# Patient Record
Sex: FEMALE | Race: WHITE | NOT HISPANIC OR LATINO | Employment: FULL TIME | URBAN - METROPOLITAN AREA
[De-identification: names, ages, dates, MRNs, and addresses within clinical notes are randomized per-mention and may not be internally consistent; named-entity substitution may affect disease eponyms.]

---

## 2024-08-30 ENCOUNTER — APPOINTMENT (OUTPATIENT)
Dept: RADIOLOGY | Facility: HOSPITAL | Age: 44
End: 2024-08-30
Payer: COMMERCIAL

## 2024-08-30 ENCOUNTER — APPOINTMENT (OUTPATIENT)
Dept: CARDIOLOGY | Facility: HOSPITAL | Age: 44
End: 2024-08-30
Payer: COMMERCIAL

## 2024-08-30 ENCOUNTER — HOSPITAL ENCOUNTER (OUTPATIENT)
Facility: HOSPITAL | Age: 44
Setting detail: OBSERVATION
Discharge: LEFT BEFORE DISCHARGE | End: 2024-08-31
Attending: STUDENT IN AN ORGANIZED HEALTH CARE EDUCATION/TRAINING PROGRAM | Admitting: INTERNAL MEDICINE
Payer: COMMERCIAL

## 2024-08-30 DIAGNOSIS — R11.2 NAUSEA VOMITING AND DIARRHEA: ICD-10-CM

## 2024-08-30 DIAGNOSIS — R53.1 GENERALIZED WEAKNESS: Primary | ICD-10-CM

## 2024-08-30 DIAGNOSIS — R19.7 NAUSEA VOMITING AND DIARRHEA: ICD-10-CM

## 2024-08-30 DIAGNOSIS — N30.00 ACUTE CYSTITIS WITHOUT HEMATURIA: ICD-10-CM

## 2024-08-30 DIAGNOSIS — R73.9 HYPERGLYCEMIA: ICD-10-CM

## 2024-08-30 DIAGNOSIS — B37.31 VAGINAL CANDIDA: ICD-10-CM

## 2024-08-30 PROBLEM — R51.9 ACUTE NONINTRACTABLE HEADACHE, UNSPECIFIED HEADACHE TYPE: Status: ACTIVE | Noted: 2024-08-30

## 2024-08-30 LAB
ALBUMIN SERPL BCP-MCNC: 3.4 G/DL (ref 3.4–5)
ALP SERPL-CCNC: 104 U/L (ref 33–110)
ALT SERPL W P-5'-P-CCNC: 39 U/L (ref 7–45)
ANION GAP SERPL CALC-SCNC: 13 MMOL/L (ref 10–20)
APPEARANCE UR: ABNORMAL
AST SERPL W P-5'-P-CCNC: 41 U/L (ref 9–39)
B-HCG SERPL-ACNC: <2 MIU/ML
B-OH-BUTYR SERPL-SCNC: 0.31 MMOL/L (ref 0.02–0.27)
BACTERIA #/AREA URNS AUTO: ABNORMAL /HPF
BASE EXCESS BLDV CALC-SCNC: 10.5 MMOL/L (ref -2–3)
BASOPHILS # BLD AUTO: 0.01 X10*3/UL (ref 0–0.1)
BASOPHILS NFR BLD AUTO: 0.1 %
BILIRUB SERPL-MCNC: 0.9 MG/DL (ref 0–1.2)
BILIRUB UR STRIP.AUTO-MCNC: NEGATIVE MG/DL
BODY TEMPERATURE: 37 DEGREES CELSIUS
BUN SERPL-MCNC: 4 MG/DL (ref 6–23)
CALCIUM SERPL-MCNC: 9.2 MG/DL (ref 8.6–10.3)
CHLORIDE SERPL-SCNC: 85 MMOL/L (ref 98–107)
CO2 SERPL-SCNC: 32 MMOL/L (ref 21–32)
COLOR UR: YELLOW
CREAT SERPL-MCNC: 0.65 MG/DL (ref 0.5–1.05)
EGFRCR SERPLBLD CKD-EPI 2021: >90 ML/MIN/1.73M*2
EOSINOPHIL # BLD AUTO: 0.03 X10*3/UL (ref 0–0.7)
EOSINOPHIL NFR BLD AUTO: 0.4 %
ERYTHROCYTE [DISTWIDTH] IN BLOOD BY AUTOMATED COUNT: 13.3 % (ref 11.5–14.5)
GLUCOSE BLD MANUAL STRIP-MCNC: 299 MG/DL (ref 74–99)
GLUCOSE BLD MANUAL STRIP-MCNC: 336 MG/DL (ref 74–99)
GLUCOSE BLD MANUAL STRIP-MCNC: 340 MG/DL (ref 74–99)
GLUCOSE BLD MANUAL STRIP-MCNC: 449 MG/DL (ref 74–99)
GLUCOSE SERPL-MCNC: 435 MG/DL (ref 74–99)
GLUCOSE UR STRIP.AUTO-MCNC: ABNORMAL MG/DL
HCO3 BLDV-SCNC: 35.1 MMOL/L (ref 22–26)
HCT VFR BLD AUTO: 36.3 % (ref 36–46)
HGB BLD-MCNC: 12.2 G/DL (ref 12–16)
IMM GRANULOCYTES # BLD AUTO: 0.02 X10*3/UL (ref 0–0.7)
IMM GRANULOCYTES NFR BLD AUTO: 0.3 % (ref 0–0.9)
INHALED O2 CONCENTRATION: 21 %
KETONES UR STRIP.AUTO-MCNC: ABNORMAL MG/DL
LACTATE SERPL-SCNC: 1.3 MMOL/L (ref 0.4–2)
LEUKOCYTE ESTERASE UR QL STRIP.AUTO: ABNORMAL
LIPASE SERPL-CCNC: <3 U/L (ref 9–82)
LYMPHOCYTES # BLD AUTO: 1.13 X10*3/UL (ref 1.2–4.8)
LYMPHOCYTES NFR BLD AUTO: 16.8 %
MAGNESIUM SERPL-MCNC: 1.82 MG/DL (ref 1.6–2.4)
MCH RBC QN AUTO: 26.1 PG (ref 26–34)
MCHC RBC AUTO-ENTMCNC: 33.6 G/DL (ref 32–36)
MCV RBC AUTO: 78 FL (ref 80–100)
MONOCYTES # BLD AUTO: 0.73 X10*3/UL (ref 0.1–1)
MONOCYTES NFR BLD AUTO: 10.9 %
NEUTROPHILS # BLD AUTO: 4.8 X10*3/UL (ref 1.2–7.7)
NEUTROPHILS NFR BLD AUTO: 71.5 %
NITRITE UR QL STRIP.AUTO: NEGATIVE
NRBC BLD-RTO: 0 /100 WBCS (ref 0–0)
OXYHGB MFR BLDV: 77.1 % (ref 45–75)
PCO2 BLDV: 45 MM HG (ref 41–51)
PH BLDV: 7.5 PH (ref 7.33–7.43)
PH UR STRIP.AUTO: 6.5 [PH]
PLATELET # BLD AUTO: 310 X10*3/UL (ref 150–450)
PO2 BLDV: 48 MM HG (ref 35–45)
POTASSIUM SERPL-SCNC: 3.3 MMOL/L (ref 3.5–5.3)
PROT SERPL-MCNC: 7.6 G/DL (ref 6.4–8.2)
PROT UR STRIP.AUTO-MCNC: ABNORMAL MG/DL
RBC # BLD AUTO: 4.67 X10*6/UL (ref 4–5.2)
RBC # UR STRIP.AUTO: ABNORMAL /UL
RBC #/AREA URNS AUTO: ABNORMAL /HPF
SAO2 % BLDV: 80 % (ref 45–75)
SARS-COV-2 RNA RESP QL NAA+PROBE: NOT DETECTED
SODIUM SERPL-SCNC: 127 MMOL/L (ref 136–145)
SP GR UR STRIP.AUTO: 1.01
SQUAMOUS #/AREA URNS AUTO: ABNORMAL /HPF
UROBILINOGEN UR STRIP.AUTO-MCNC: ABNORMAL MG/DL
WBC # BLD AUTO: 6.7 X10*3/UL (ref 4.4–11.3)
WBC #/AREA URNS AUTO: >50 /HPF

## 2024-08-30 PROCEDURE — 84075 ASSAY ALKALINE PHOSPHATASE: CPT | Performed by: PHYSICIAN ASSISTANT

## 2024-08-30 PROCEDURE — 84702 CHORIONIC GONADOTROPIN TEST: CPT | Performed by: PHYSICIAN ASSISTANT

## 2024-08-30 PROCEDURE — 83036 HEMOGLOBIN GLYCOSYLATED A1C: CPT | Performed by: STUDENT IN AN ORGANIZED HEALTH CARE EDUCATION/TRAINING PROGRAM

## 2024-08-30 PROCEDURE — 2500000002 HC RX 250 W HCPCS SELF ADMINISTERED DRUGS (ALT 637 FOR MEDICARE OP, ALT 636 FOR OP/ED): Performed by: STUDENT IN AN ORGANIZED HEALTH CARE EDUCATION/TRAINING PROGRAM

## 2024-08-30 PROCEDURE — 96365 THER/PROPH/DIAG IV INF INIT: CPT

## 2024-08-30 PROCEDURE — 81001 URINALYSIS AUTO W/SCOPE: CPT | Performed by: PHYSICIAN ASSISTANT

## 2024-08-30 PROCEDURE — 82947 ASSAY GLUCOSE BLOOD QUANT: CPT

## 2024-08-30 PROCEDURE — 36415 COLL VENOUS BLD VENIPUNCTURE: CPT | Performed by: PHYSICIAN ASSISTANT

## 2024-08-30 PROCEDURE — G0378 HOSPITAL OBSERVATION PER HR: HCPCS

## 2024-08-30 PROCEDURE — 96375 TX/PRO/DX INJ NEW DRUG ADDON: CPT

## 2024-08-30 PROCEDURE — 71045 X-RAY EXAM CHEST 1 VIEW: CPT | Performed by: RADIOLOGY

## 2024-08-30 PROCEDURE — 93005 ELECTROCARDIOGRAM TRACING: CPT

## 2024-08-30 PROCEDURE — 83605 ASSAY OF LACTIC ACID: CPT | Performed by: PHYSICIAN ASSISTANT

## 2024-08-30 PROCEDURE — 2500000001 HC RX 250 WO HCPCS SELF ADMINISTERED DRUGS (ALT 637 FOR MEDICARE OP): Performed by: STUDENT IN AN ORGANIZED HEALTH CARE EDUCATION/TRAINING PROGRAM

## 2024-08-30 PROCEDURE — 71045 X-RAY EXAM CHEST 1 VIEW: CPT

## 2024-08-30 PROCEDURE — 83735 ASSAY OF MAGNESIUM: CPT | Performed by: PHYSICIAN ASSISTANT

## 2024-08-30 PROCEDURE — 99285 EMERGENCY DEPT VISIT HI MDM: CPT | Mod: 25

## 2024-08-30 PROCEDURE — 96361 HYDRATE IV INFUSION ADD-ON: CPT

## 2024-08-30 PROCEDURE — 85025 COMPLETE CBC W/AUTO DIFF WBC: CPT | Performed by: PHYSICIAN ASSISTANT

## 2024-08-30 PROCEDURE — 2500000004 HC RX 250 GENERAL PHARMACY W/ HCPCS (ALT 636 FOR OP/ED): Performed by: STUDENT IN AN ORGANIZED HEALTH CARE EDUCATION/TRAINING PROGRAM

## 2024-08-30 PROCEDURE — 96366 THER/PROPH/DIAG IV INF ADDON: CPT

## 2024-08-30 PROCEDURE — 82010 KETONE BODYS QUAN: CPT | Performed by: PHYSICIAN ASSISTANT

## 2024-08-30 PROCEDURE — 83690 ASSAY OF LIPASE: CPT | Performed by: PHYSICIAN ASSISTANT

## 2024-08-30 PROCEDURE — 82805 BLOOD GASES W/O2 SATURATION: CPT | Performed by: PHYSICIAN ASSISTANT

## 2024-08-30 PROCEDURE — 2500000004 HC RX 250 GENERAL PHARMACY W/ HCPCS (ALT 636 FOR OP/ED): Performed by: PHYSICIAN ASSISTANT

## 2024-08-30 PROCEDURE — 82947 ASSAY GLUCOSE BLOOD QUANT: CPT | Mod: 59

## 2024-08-30 PROCEDURE — 2580000001 HC RX 258 IV SOLUTIONS: Performed by: STUDENT IN AN ORGANIZED HEALTH CARE EDUCATION/TRAINING PROGRAM

## 2024-08-30 PROCEDURE — 87086 URINE CULTURE/COLONY COUNT: CPT | Mod: PARLAB | Performed by: PHYSICIAN ASSISTANT

## 2024-08-30 PROCEDURE — 87635 SARS-COV-2 COVID-19 AMP PRB: CPT | Performed by: PHYSICIAN ASSISTANT

## 2024-08-30 RX ORDER — INSULIN LISPRO 100 [IU]/ML
10 INJECTION, SOLUTION INTRAVENOUS; SUBCUTANEOUS ONCE
Status: COMPLETED | OUTPATIENT
Start: 2024-08-31 | End: 2024-08-31

## 2024-08-30 RX ORDER — MAGNESIUM HYDROXIDE 2400 MG/10ML
10 SUSPENSION ORAL DAILY PRN
Status: DISCONTINUED | OUTPATIENT
Start: 2024-08-30 | End: 2024-08-31 | Stop reason: HOSPADM

## 2024-08-30 RX ORDER — FLUCONAZOLE 100 MG/1
150 TABLET ORAL ONCE
Status: COMPLETED | OUTPATIENT
Start: 2024-08-30 | End: 2024-08-30

## 2024-08-30 RX ORDER — TALC
6 POWDER (GRAM) TOPICAL NIGHTLY PRN
Status: DISCONTINUED | OUTPATIENT
Start: 2024-08-30 | End: 2024-08-31 | Stop reason: HOSPADM

## 2024-08-30 RX ORDER — METHADONE HYDROCHLORIDE 10 MG/5ML
70 SOLUTION ORAL DAILY
COMMUNITY
End: 2024-08-31 | Stop reason: HOSPADM

## 2024-08-30 RX ORDER — ONDANSETRON HYDROCHLORIDE 2 MG/ML
4 INJECTION, SOLUTION INTRAVENOUS EVERY 6 HOURS PRN
Status: DISCONTINUED | OUTPATIENT
Start: 2024-08-30 | End: 2024-08-31 | Stop reason: HOSPADM

## 2024-08-30 RX ORDER — SODIUM CHLORIDE AND POTASSIUM CHLORIDE 300; 900 MG/100ML; MG/100ML
100 INJECTION, SOLUTION INTRAVENOUS CONTINUOUS
Status: ACTIVE | OUTPATIENT
Start: 2024-08-30 | End: 2024-08-31

## 2024-08-30 RX ORDER — ACETAMINOPHEN 325 MG/1
650 TABLET ORAL EVERY 4 HOURS PRN
Status: DISCONTINUED | OUTPATIENT
Start: 2024-08-30 | End: 2024-08-31 | Stop reason: HOSPADM

## 2024-08-30 RX ORDER — METFORMIN HYDROCHLORIDE 1000 MG/1
1000 TABLET ORAL 2 TIMES DAILY
COMMUNITY

## 2024-08-30 RX ORDER — INSULIN LISPRO 100 [IU]/ML
0-10 INJECTION, SOLUTION INTRAVENOUS; SUBCUTANEOUS
Status: DISCONTINUED | OUTPATIENT
Start: 2024-08-30 | End: 2024-08-31 | Stop reason: HOSPADM

## 2024-08-30 RX ORDER — MAGNESIUM SULFATE HEPTAHYDRATE 40 MG/ML
2 INJECTION, SOLUTION INTRAVENOUS ONCE
Status: COMPLETED | OUTPATIENT
Start: 2024-08-30 | End: 2024-08-31

## 2024-08-30 RX ORDER — ACETAMINOPHEN 325 MG/1
650 TABLET ORAL EVERY 4 HOURS PRN
Status: DISCONTINUED | OUTPATIENT
Start: 2024-08-30 | End: 2024-08-30

## 2024-08-30 RX ORDER — KETOROLAC TROMETHAMINE 30 MG/ML
15 INJECTION, SOLUTION INTRAMUSCULAR; INTRAVENOUS ONCE
Status: COMPLETED | OUTPATIENT
Start: 2024-08-30 | End: 2024-08-30

## 2024-08-30 RX ORDER — PANTOPRAZOLE SODIUM 40 MG/10ML
40 INJECTION, POWDER, LYOPHILIZED, FOR SOLUTION INTRAVENOUS DAILY
Status: DISCONTINUED | OUTPATIENT
Start: 2024-08-31 | End: 2024-08-31 | Stop reason: HOSPADM

## 2024-08-30 RX ORDER — METHADONE HYDROCHLORIDE 10 MG/1
70 TABLET ORAL DAILY
Status: DISCONTINUED | OUTPATIENT
Start: 2024-08-31 | End: 2024-08-31 | Stop reason: HOSPADM

## 2024-08-30 RX ORDER — ACETAMINOPHEN 325 MG/1
975 TABLET ORAL ONCE
Status: COMPLETED | OUTPATIENT
Start: 2024-08-30 | End: 2024-08-30

## 2024-08-30 RX ADMIN — INSULIN LISPRO 8 UNITS: 100 INJECTION, SOLUTION INTRAVENOUS; SUBCUTANEOUS at 22:12

## 2024-08-30 RX ADMIN — KETOROLAC TROMETHAMINE 15 MG: 30 INJECTION, SOLUTION INTRAMUSCULAR at 22:11

## 2024-08-30 RX ADMIN — SODIUM CHLORIDE, POTASSIUM CHLORIDE, SODIUM LACTATE AND CALCIUM CHLORIDE 1000 ML: 600; 310; 30; 20 INJECTION, SOLUTION INTRAVENOUS at 18:52

## 2024-08-30 RX ADMIN — FLUCONAZOLE 150 MG: 100 TABLET ORAL at 22:11

## 2024-08-30 RX ADMIN — POTASSIUM CHLORIDE AND SODIUM CHLORIDE 100 ML/HR: 900; 300 INJECTION, SOLUTION INTRAVENOUS at 22:12

## 2024-08-30 RX ADMIN — ACETAMINOPHEN 975 MG: 325 TABLET ORAL at 16:17

## 2024-08-30 RX ADMIN — MAGNESIUM SULFATE HEPTAHYDRATE 2 G: 40 INJECTION, SOLUTION INTRAVENOUS at 22:29

## 2024-08-30 SDOH — SOCIAL STABILITY: SOCIAL INSECURITY: WERE YOU ABLE TO COMPLETE ALL THE BEHAVIORAL HEALTH SCREENINGS?: YES

## 2024-08-30 SDOH — SOCIAL STABILITY: SOCIAL INSECURITY: ABUSE: ADULT

## 2024-08-30 SDOH — SOCIAL STABILITY: SOCIAL INSECURITY: HAVE YOU HAD THOUGHTS OF HARMING ANYONE ELSE?: NO

## 2024-08-30 SDOH — SOCIAL STABILITY: SOCIAL INSECURITY: DO YOU FEEL ANYONE HAS EXPLOITED OR TAKEN ADVANTAGE OF YOU FINANCIALLY OR OF YOUR PERSONAL PROPERTY?: NO

## 2024-08-30 SDOH — SOCIAL STABILITY: SOCIAL INSECURITY: DO YOU FEEL UNSAFE GOING BACK TO THE PLACE WHERE YOU ARE LIVING?: NO

## 2024-08-30 SDOH — SOCIAL STABILITY: SOCIAL INSECURITY: ARE THERE ANY APPARENT SIGNS OF INJURIES/BEHAVIORS THAT COULD BE RELATED TO ABUSE/NEGLECT?: NO

## 2024-08-30 SDOH — SOCIAL STABILITY: SOCIAL INSECURITY: HAVE YOU HAD ANY THOUGHTS OF HARMING ANYONE ELSE?: NO

## 2024-08-30 SDOH — SOCIAL STABILITY: SOCIAL INSECURITY: ARE YOU OR HAVE YOU BEEN THREATENED OR ABUSED PHYSICALLY, EMOTIONALLY, OR SEXUALLY BY ANYONE?: NO

## 2024-08-30 SDOH — SOCIAL STABILITY: SOCIAL INSECURITY: DOES ANYONE TRY TO KEEP YOU FROM HAVING/CONTACTING OTHER FRIENDS OR DOING THINGS OUTSIDE YOUR HOME?: NO

## 2024-08-30 SDOH — SOCIAL STABILITY: SOCIAL INSECURITY: HAS ANYONE EVER THREATENED TO HURT YOUR FAMILY OR YOUR PETS?: NO

## 2024-08-30 ASSESSMENT — ACTIVITIES OF DAILY LIVING (ADL)
JUDGMENT_ADEQUATE_SAFELY_COMPLETE_DAILY_ACTIVITIES: YES
DRESSING YOURSELF: INDEPENDENT
FEEDING YOURSELF: INDEPENDENT
LACK_OF_TRANSPORTATION: YES
WALKS IN HOME: INDEPENDENT
GROOMING: INDEPENDENT
PATIENT'S MEMORY ADEQUATE TO SAFELY COMPLETE DAILY ACTIVITIES?: YES
HEARING - LEFT EAR: FUNCTIONAL
HEARING - RIGHT EAR: FUNCTIONAL
BATHING: INDEPENDENT
ADEQUATE_TO_COMPLETE_ADL: YES
TOILETING: INDEPENDENT

## 2024-08-30 ASSESSMENT — LIFESTYLE VARIABLES
HOW OFTEN DURING THE LAST YEAR HAVE YOU FAILED TO DO WHAT WAS NORMALLY EXPECTED FROM YOU BECAUSE OF DRINKING: NEVER
AUDIT-C TOTAL SCORE: 4
HAS A RELATIVE, FRIEND, DOCTOR, OR ANOTHER HEALTH PROFESSIONAL EXPRESSED CONCERN ABOUT YOUR DRINKING OR SUGGESTED YOU CUT DOWN: NO
EVER FELT BAD OR GUILTY ABOUT YOUR DRINKING: NO
HOW OFTEN DO YOU HAVE 6 OR MORE DRINKS ON ONE OCCASION: LESS THAN MONTHLY
HAVE YOU OR SOMEONE ELSE BEEN INJURED AS A RESULT OF YOUR DRINKING: NO
TOTAL SCORE: 0
HOW OFTEN DURING THE LAST YEAR HAVE YOU BEEN UNABLE TO REMEMBER WHAT HAPPENED THE NIGHT BEFORE BECAUSE YOU HAD BEEN DRINKING: NEVER
HOW OFTEN DURING THE LAST YEAR HAVE YOU FOUND THAT YOU WERE NOT ABLE TO STOP DRINKING ONCE YOU HAD STARTED: NEVER
HOW OFTEN DURING THE LAST YEAR HAVE YOU NEEDED AN ALCOHOLIC DRINK FIRST THING IN THE MORNING TO GET YOURSELF GOING AFTER A NIGHT OF HEAVY DRINKING: NEVER
SKIP TO QUESTIONS 9-10: 0
EVER HAD A DRINK FIRST THING IN THE MORNING TO STEADY YOUR NERVES TO GET RID OF A HANGOVER: NO
PRESCIPTION_ABUSE_PAST_12_MONTHS: NO
SUBSTANCE_ABUSE_PAST_12_MONTHS: NO
HOW MANY STANDARD DRINKS CONTAINING ALCOHOL DO YOU HAVE ON A TYPICAL DAY: 5 OR 6
AUDIT TOTAL SCORE: 0
HAVE YOU EVER FELT YOU SHOULD CUT DOWN ON YOUR DRINKING: NO
HOW OFTEN DURING THE LAST YEAR HAVE YOU HAD A FEELING OF GUILT OR REMORSE AFTER DRINKING: NEVER
HOW OFTEN DO YOU HAVE A DRINK CONTAINING ALCOHOL: MONTHLY OR LESS
AUDIT-C TOTAL SCORE: 4
HAVE PEOPLE ANNOYED YOU BY CRITICIZING YOUR DRINKING: NO
AUDIT TOTAL SCORE: 4

## 2024-08-30 ASSESSMENT — COGNITIVE AND FUNCTIONAL STATUS - GENERAL
MOBILITY SCORE: 24
DAILY ACTIVITIY SCORE: 24
PATIENT BASELINE BEDBOUND: NO
MOBILITY SCORE: 24
DAILY ACTIVITIY SCORE: 24

## 2024-08-30 ASSESSMENT — PAIN SCALES - GENERAL
PAINLEVEL_OUTOF10: 0 - NO PAIN

## 2024-08-30 ASSESSMENT — COLUMBIA-SUICIDE SEVERITY RATING SCALE - C-SSRS
1. IN THE PAST MONTH, HAVE YOU WISHED YOU WERE DEAD OR WISHED YOU COULD GO TO SLEEP AND NOT WAKE UP?: NO
2. HAVE YOU ACTUALLY HAD ANY THOUGHTS OF KILLING YOURSELF?: NO
6. HAVE YOU EVER DONE ANYTHING, STARTED TO DO ANYTHING, OR PREPARED TO DO ANYTHING TO END YOUR LIFE?: NO

## 2024-08-30 ASSESSMENT — PATIENT HEALTH QUESTIONNAIRE - PHQ9
2. FEELING DOWN, DEPRESSED OR HOPELESS: NOT AT ALL
1. LITTLE INTEREST OR PLEASURE IN DOING THINGS: SEVERAL DAYS
SUM OF ALL RESPONSES TO PHQ9 QUESTIONS 1 & 2: 1

## 2024-08-30 ASSESSMENT — PAIN - FUNCTIONAL ASSESSMENT: PAIN_FUNCTIONAL_ASSESSMENT: 0-10

## 2024-08-30 NOTE — PROGRESS NOTES
Pharmacy Medication History Review    Rosalinda Brizuela is a 43 y.o. female admitted for No Principal Problem: There is no principal problem currently on the Problem List. Please update the Problem List and refresh.. Pharmacy reviewed the patient's mbuxs-ba-polualycy medications and allergies for accuracy.    The list below reflectives the updated PTA list. Please review each medication in order reconciliation for additional clarification and justification.  Prior to Admission medications    Medication Sig Start Date End Date Authorizing Provider   metFORMIN (Glucophage) 1,000 mg tablet Take 1 tablet (1,000 mg) by mouth 2 times a day.  Last filled 8/25/23   Historical Provider, MD   methadone (Dolophine) 10 mg/5 mL solution Take 35 mL (70 mg) by mouth once daily.   Historical Provider, MD        The list below reflectives the updated allergy list. Please review each documented allergy for additional clarification and justification.  Allergies  Reviewed by Melody Jaramillo RN on 8/30/2024        Severity Reactions Comments    Cephalexin Not Specified Unknown Childhood allergy    Penicillins Not Specified Unknown Childhood allergy    Ciprofloxacin Low Swelling     Fluocinolone Low Swelling     Sulfamethoxazole-trimethoprim Low Hives, Itching             Below are additional concerns with the patient's PTA list.      Yulia Del Rio

## 2024-08-30 NOTE — ED PROVIDER NOTES
Limitations to History: None  External Records Reviewed  Independent Historians: self  Social determinants affecting care: none    HPI  Rosalinda Brizuela is a 43 y.o. female who presents to the [facility] emergency department for assessment of nausea, vomiting, and diarrhea for 5 days.  She reports that she is unable to keep anything down.  She has no appetite.  Reports some lower abdominal cramping.  She believes that she has had fever but she is unsure how high.  She denies chest pains or shortness of breath.  She reports that she is supposed to take her metformin with meals however she has not been doing this because she has not been eating.  She went to urgent care and her glucose was high and she was sent to the ER for further assessment.  She reports that she has been having polydipsia and polyuria.  She also reports that she has been having white clumpy vaginal discharge which she is concerned about possible yeast infection.  She has no further complaints.    [facility]  Past Medical History:   Diagnosis Date    Diabetes mellitus (Multi)     Drug abuse (Multi)     Ovarian cyst     Personal history of other diseases of the respiratory system     History of bronchitis    Personal history of other endocrine, nutritional and metabolic disease     History of diabetes mellitus    reviewed by myself.    Meds  Current Outpatient Medications   Medication Instructions    metFORMIN (GLUCOPHAGE) 1,000 mg, oral, 2 times daily    methadone (DOLOPHINE) 70 mg, oral, Daily       Allergies  Allergies   Allergen Reactions    Cephalexin Unknown     Childhood allergy    Penicillins Unknown     Childhood allergy    Ciprofloxacin Swelling    Fluocinolone Swelling    Sulfamethoxazole-Trimethoprim Hives and Itching    reviewed by myself.    SHx  Social History     Tobacco Use    Smoking status: Former     Types: Cigarettes    Smokeless tobacco: Never   Vaping Use    Vaping status: Never Used   Substance Use Topics    Alcohol use: Not Currently     "Drug use: Not Currently    reviewed by myself.      ------------------------------------------------------------------------------------------------------------------------------------------    /66 (Patient Position: Lying)   Pulse 67   Temp 37.9 °C (100.2 °F) (Temporal)   Resp 16   Ht 1.626 m (5' 4\")   Wt 69.9 kg (154 lb)   LMP 08/12/2024 (Approximate)   SpO2 95%   BMI 26.43 kg/m²     Physical Exam  Vitals and nursing note reviewed.   Constitutional:       General: She is not in acute distress.     Appearance: Normal appearance. She is normal weight. She is ill-appearing. She is not toxic-appearing.   HENT:      Head: Normocephalic.      Nose: Nose normal.      Mouth/Throat:      Mouth: Mucous membranes are dry.      Pharynx: Oropharynx is clear.   Eyes:      Extraocular Movements: Extraocular movements intact.      Conjunctiva/sclera: Conjunctivae normal.   Cardiovascular:      Rate and Rhythm: Regular rhythm. Tachycardia present.   Pulmonary:      Effort: Pulmonary effort is normal.      Breath sounds: Normal breath sounds.   Abdominal:      General: Abdomen is flat. Bowel sounds are normal.      Palpations: Abdomen is soft.      Tenderness: There is no abdominal tenderness. There is no guarding or rebound.   Musculoskeletal:         General: Normal range of motion.      Cervical back: Neck supple.   Skin:     General: Skin is warm and dry.   Neurological:      Mental Status: She is alert and oriented to person, place, and time.   Psychiatric:         Attention and Perception: Attention normal.         Mood and Affect: Mood normal.          ------------------------------------------------------------------------------------------------------------------------------------------  Labs  Labs Reviewed   CBC WITH AUTO DIFFERENTIAL - Abnormal       Result Value    WBC 6.7      nRBC 0.0      RBC 4.67      Hemoglobin 12.2      Hematocrit 36.3      MCV 78 (*)     MCH 26.1      MCHC 33.6      RDW 13.3      " Platelets 310      Neutrophils % 71.5      Immature Granulocytes %, Automated 0.3      Lymphocytes % 16.8      Monocytes % 10.9      Eosinophils % 0.4      Basophils % 0.1      Neutrophils Absolute 4.80      Immature Granulocytes Absolute, Automated 0.02      Lymphocytes Absolute 1.13 (*)     Monocytes Absolute 0.73      Eosinophils Absolute 0.03      Basophils Absolute 0.01     COMPREHENSIVE METABOLIC PANEL - Abnormal    Glucose 435 (*)     Sodium 127 (*)     Potassium 3.3 (*)     Chloride 85 (*)     Bicarbonate 32      Anion Gap 13      Urea Nitrogen 4 (*)     Creatinine 0.65      eGFR >90      Calcium 9.2      Albumin 3.4      Alkaline Phosphatase 104      Total Protein 7.6      AST 41 (*)     Bilirubin, Total 0.9      ALT 39     LIPASE - Abnormal    Lipase <3 (*)     Narrative:     Venipuncture immediately after or during the administration of Metamizole may lead to falsely low results. Testing should be performed immediately prior to Metamizole dosing.   BLOOD GAS VENOUS - Abnormal    POCT pH, Venous 7.50 (*)     POCT pCO2, Venous 45      POCT pO2, Venous 48 (*)     POCT SO2, Venous 80 (*)     POCT Oxy Hemoglobin, Venous 77.1 (*)     POCT Base Excess, Venous 10.5 (*)     POCT HCO3 Calculated, Venous 35.1 (*)     Patient Temperature 37.0      FiO2 21     BETA HYDROXYBUTYRATE - Abnormal    Beta-Hydroxybutyrate 0.31 (*)     Narrative:     The beta-hydroxybutyrate test performance characteristics have been validated by  [facility]. This test has not been approved by the FDA; however,such approval is not necessary.       POCT GLUCOSE - Abnormal    POCT Glucose 449 (*)    MAGNESIUM - Normal    Magnesium 1.82     LACTATE - Normal    Lactate 1.3      Narrative:     Venipuncture immediately after or during the administration of Metamizole may lead to falsely low results. Testing should be performed immediately  prior to Metamizole dosing.   SARS-COV-2 PCR - Normal    Coronavirus  2019, PCR Not Detected      Narrative:     This assay has received FDA Emergency Use Authorization (EUA) and is only authorized for the duration of time that circumstances exist to justify the authorization of the emergency use of in vitro diagnostic tests for the detection of SARS-CoV-2 virus and/or diagnosis of COVID-19 infection under section 564(b)(1) of the Act, 21 U.S.C. 360bbb-3(b)(1). This assay is an in vitro diagnostic nucleic acid amplification test for the qualitative detection of SARS-CoV-2 from nasopharyngeal specimens and has been validated for use at [facility]. Negative results do not preclude COVID-19 infections and should not be used as the sole basis for diagnosis, treatment, or other management decisions.     HUMAN CHORIONIC GONADOTROPIN, SERUM QUANTITATIVE - Normal    HCG, Beta-Quantitative <2      Narrative:      Total HCG measurement is performed using the [employer] Access   Immunoassay which detects intact HCG and free beta HCG subunit.    This test is not indicated for use as a tumor marker.   HCG testing is performed using a different test methodology at [facility] than other [facility]. Direct result comparison   should only be made within the same method.       URINALYSIS WITH REFLEX CULTURE AND MICROSCOPIC    Narrative:     The following orders were created for panel order Urinalysis with Reflex Culture and Microscopic.  Procedure                               Abnormality         Status                     ---------                               -----------         ------                     Urinalysis with Reflex C...[[id number]]                                                 Extra Urine Gray Tube[[id number]]                                                         Please view results for these tests on the individual orders.   URINALYSIS WITH REFLEX CULTURE AND MICROSCOPIC   EXTRA URINE GRAY TUBE        Imaging  XR chest 1 view   Final  Result   1.  No active cardiopulmonary disease.  There has not been   significant interval change from the prior exam.        Signed by: Philip Hernández 8/30/2024 4:31 PM   Dictation workstation:   [id number]           ED Course  ED Course as of 08/30/24 1909   Fri Aug 30, 2024   1720 Interpreted by the Emergency Department Attending: ECG revealed normal sinus rhythm at a rate of 83 beats per minute with MO interval 142 , QRS of 90 , QTc of 436.  No acute injury pattern.  No previous EKG to compare. [MG]      ED Course User Index  [MG] Adrien Mckeon DO         Diagnoses as of 08/30/24 1909   Generalized weakness   Nausea vomiting and diarrhea   Hyperglycemia        Medical Decision Making: She appears ill however nontoxic.  Vital signs reviewed.  Temp 37.9.  Heart rate 108.  BP stable.    Differential diagnoses considered: Dehydration, CIRA, electrolyte abnormalities, COVID, viral illness, gastroenteritis, UTI, DKA, HHS, others    Medications given: Oral Tylenol,  IV fluids    I reviewed the labs from today.  No leukocytosis leukopenia.  H&H is stable.  BUN 4 with creatinine 0.65.  Glucose 435.  Sodium 127.  Potassium 3.3 COVID is negative.  Urine pending.  I discussed with the patient about the workup today.  Unclear exactly was causing her symptoms however do recommend admission as she is noncompliant with her medication she has hyperglycemia.  Concerned that she could potentially have increased risk of HHS or DKA if I discharged her home.  She is agreeable.  I consulted general medicine on-call.  I spoke with Dr. Simpson.  He will admit.  Case discussed and evaluated with ED attending who is agreeable to patient plan of care.    Diagnosis: Hyperglycemia, nausea, vomiting, diarrhea  Plan: Admit           Nael Little PA-C  08/30/24 1909

## 2024-08-30 NOTE — H&P
"History Of Present Illness  This is a 43-year-old female with medical history significant for diabetes type 2 not well-controlled (not clear her compliance with metformin, refusing insulin), depression/anxiety/bipolar, history of meningioma resection 2015, and history of opioid abuse (oxycodone) on methadone (states she is supposed to take 70 mg but taking only 40 mg) who presented to the ED on 8/30/2024 due to frontal headache with nauseas and vomiting, poor appetite-\"nothing tasting good\", diarrhea/loose stools, and vaginal discharge/itchiness for the past 5 days.  Patient was at work when one of her colleagues suggested her to go to urgent care, due to high blood sugars sent to [facility].  During my assessment denied: fever, visual changes, chest pain, palpitations, shortness of breath, abdomen pain, constipation/diarrhea, bloody stools, falls, and loss of consciousness.     ED course: POC glucose 449, CBC with WBC 6.7, hemoglobin 12, platelets 310, CMP: Glucose 435, sodium 127, potassium 3.3, creatinine 0.6, BUN 4, anion gap 13, bicarb 32, chloride 85, calcium 9.2, albumin 3.4, AST 41 ALT 39.  Magnesium 1.8.  Lipase less than 3.  Lactate 1.3.  Beta hydroxybutyrate 0.3.  Lactic acid is negative.  UA abnormal: Cloudy, positive for glucose, protein, blood, ketones, leukoesterase 75, WBC more than 50, bacteria 4+.       Past Medical History  As above.    Surgical History  Tubal ligation.  Ovarian cyst removal.  Cholecystectomy.  Meningioma resection. Neck abscess I&D.      Social History  She reports that she has quit smoking. Her smoking use included cigarettes. She has never used smokeless tobacco. She reports that she does not currently use alcohol. She reports that she does not currently use drugs.  Living with her daughter and son after her  passed away last year.    Family History  Mother and father: Diabetes, heart disease.     Allergies  Cephalexin, Penicillins, Ciprofloxacin, Fluocinolone, and " Sulfamethoxazole-trimethoprim    Review of Systems  A 12 point review of systems was performed and all systems were negative/normal except what is noted in the HPI. Please refer to the HPI for details.     Physical Exam  General: alert and oriented. No acute distress.  HEENT: oral mucosa moist, EOMI. No JVD.   CHEST: clear breath sounds, no crackles, no wheeze, no tachypnea.  CVS: regular rate and rhythm, no murmurs.   ABD: Soft, Non Tender, BS present.  EXT: no edema.  SKIN: no rash.  NEURO: Nonfocal grossly.       Last Recorded Vitals  /66 (Patient Position: Lying)   Pulse 67   Temp 37.9 °C (100.2 °F) (Temporal)   Resp 16   Wt 69.9 kg (154 lb)   SpO2 95%        Assessment/Plan     Hyperglycemia  Poorly controlled DM II  -Last A1c 1/2024 13.2, pending new A1C  -Home meds metformin 1g twice daily  -Not clear her compliance, refusing insulin  -After long talk agreeable with ISS#2  -Discuss oral treatments vs insulin    Hyponatremia 2/2 hyperglycemia  -Sodium 127 , corrected 132-135  -Monitor BMP    Hypokalemia  -Potassium 3.3  -IV fluids with potassium given  -Monitor BMP and replete as needed    UTI  -Abnormal UA  -Pending urine culture  -Started on ceftriaxone 1 g daily    Vaginal candidiasis  -Fluconazole 150 mg once  -Needs follow-up as an outpatient    Mood disorder  Hx of opioids abuse  -States she uses methadone 40 mg daily  -Pending pharmacy verification, no data on OARRS      Diet: Carb controlled  Lines/Devices: peripheral   Drips: NS with potassium at 100 mL/h  ATBx: Ceftriaxone  PPX: Pantoprazole and enoxaparin  Code: Full code  Dispo: Medicine floor  Discharge planning: Lives with daughter and son.  Go back home when appropriate.      *This document was created using dragon dictation and may contain unintended error.    Brittney Hunt MD

## 2024-08-30 NOTE — ED TRIAGE NOTES
PT. C/O N/V/D, WEAKNESS, FATIGUE, EXCESSIVE THIRST SINCE MONDAY. PT. ALSO C/O VAGINAL ITCHY WITH CLEAR/YELLOW DISCHARGE AND CLOUDY/BLOODY URINE. PT. DENIES PAIN IN TRIAGE.

## 2024-08-31 ENCOUNTER — DOCUMENTATION (OUTPATIENT)
Dept: INPATIENT UNIT | Facility: HOSPITAL | Age: 44
End: 2024-08-31
Payer: COMMERCIAL

## 2024-08-31 VITALS
OXYGEN SATURATION: 98 % | RESPIRATION RATE: 16 BRPM | HEART RATE: 94 BPM | BODY MASS INDEX: 26.29 KG/M2 | SYSTOLIC BLOOD PRESSURE: 120 MMHG | HEIGHT: 64 IN | DIASTOLIC BLOOD PRESSURE: 61 MMHG | TEMPERATURE: 98.8 F | WEIGHT: 154 LBS

## 2024-08-31 LAB
25(OH)D3 SERPL-MCNC: 22 NG/ML (ref 30–100)
ANION GAP SERPL CALC-SCNC: 13 MMOL/L (ref 10–20)
BUN SERPL-MCNC: 7 MG/DL (ref 6–23)
CALCIUM SERPL-MCNC: 8.4 MG/DL (ref 8.6–10.3)
CHLORIDE SERPL-SCNC: 93 MMOL/L (ref 98–107)
CHOLEST SERPL-MCNC: 77 MG/DL (ref 0–199)
CHOLESTEROL/HDL RATIO: 5.6
CO2 SERPL-SCNC: 29 MMOL/L (ref 21–32)
CREAT SERPL-MCNC: 0.45 MG/DL (ref 0.5–1.05)
EGFRCR SERPLBLD CKD-EPI 2021: >90 ML/MIN/1.73M*2
ERYTHROCYTE [DISTWIDTH] IN BLOOD BY AUTOMATED COUNT: 13.2 % (ref 11.5–14.5)
GLUCOSE BLD MANUAL STRIP-MCNC: 194 MG/DL (ref 74–99)
GLUCOSE BLD MANUAL STRIP-MCNC: 204 MG/DL (ref 74–99)
GLUCOSE BLD MANUAL STRIP-MCNC: 307 MG/DL (ref 74–99)
GLUCOSE SERPL-MCNC: 192 MG/DL (ref 74–99)
HCT VFR BLD AUTO: 34.1 % (ref 36–46)
HDLC SERPL-MCNC: 13.7 MG/DL
HGB BLD-MCNC: 10.6 G/DL (ref 12–16)
HOLD SPECIMEN: NORMAL
HOLD SPECIMEN: NORMAL
LDLC SERPL CALC-MCNC: 40 MG/DL
MAGNESIUM SERPL-MCNC: 1.91 MG/DL (ref 1.6–2.4)
MCH RBC QN AUTO: 25.6 PG (ref 26–34)
MCHC RBC AUTO-ENTMCNC: 31.1 G/DL (ref 32–36)
MCV RBC AUTO: 82 FL (ref 80–100)
NON HDL CHOLESTEROL: 63 MG/DL (ref 0–149)
NRBC BLD-RTO: 0 /100 WBCS (ref 0–0)
PLATELET # BLD AUTO: 237 X10*3/UL (ref 150–450)
POTASSIUM SERPL-SCNC: 3.5 MMOL/L (ref 3.5–5.3)
RBC # BLD AUTO: 4.14 X10*6/UL (ref 4–5.2)
SODIUM SERPL-SCNC: 131 MMOL/L (ref 136–145)
T4 FREE SERPL-MCNC: 1.09 NG/DL (ref 0.61–1.12)
TRIGL SERPL-MCNC: 119 MG/DL (ref 0–149)
TSH SERPL-ACNC: 0.94 MIU/L (ref 0.44–3.98)
VLDL: 24 MG/DL (ref 0–40)
WBC # BLD AUTO: 5.8 X10*3/UL (ref 4.4–11.3)

## 2024-08-31 PROCEDURE — 82306 VITAMIN D 25 HYDROXY: CPT | Mod: PARLAB | Performed by: SPECIALIST

## 2024-08-31 PROCEDURE — 36415 COLL VENOUS BLD VENIPUNCTURE: CPT | Performed by: STUDENT IN AN ORGANIZED HEALTH CARE EDUCATION/TRAINING PROGRAM

## 2024-08-31 PROCEDURE — 80061 LIPID PANEL: CPT | Performed by: SPECIALIST

## 2024-08-31 PROCEDURE — 2500000004 HC RX 250 GENERAL PHARMACY W/ HCPCS (ALT 636 FOR OP/ED): Performed by: STUDENT IN AN ORGANIZED HEALTH CARE EDUCATION/TRAINING PROGRAM

## 2024-08-31 PROCEDURE — 84443 ASSAY THYROID STIM HORMONE: CPT | Performed by: SPECIALIST

## 2024-08-31 PROCEDURE — 96367 TX/PROPH/DG ADDL SEQ IV INF: CPT

## 2024-08-31 PROCEDURE — 82947 ASSAY GLUCOSE BLOOD QUANT: CPT | Mod: 59

## 2024-08-31 PROCEDURE — 96372 THER/PROPH/DIAG INJ SC/IM: CPT | Performed by: STUDENT IN AN ORGANIZED HEALTH CARE EDUCATION/TRAINING PROGRAM

## 2024-08-31 PROCEDURE — S0109 METHADONE ORAL 5MG: HCPCS

## 2024-08-31 PROCEDURE — 84439 ASSAY OF FREE THYROXINE: CPT | Performed by: SPECIALIST

## 2024-08-31 PROCEDURE — 80048 BASIC METABOLIC PNL TOTAL CA: CPT | Performed by: STUDENT IN AN ORGANIZED HEALTH CARE EDUCATION/TRAINING PROGRAM

## 2024-08-31 PROCEDURE — G0378 HOSPITAL OBSERVATION PER HR: HCPCS

## 2024-08-31 PROCEDURE — 2580000001 HC RX 258 IV SOLUTIONS: Performed by: STUDENT IN AN ORGANIZED HEALTH CARE EDUCATION/TRAINING PROGRAM

## 2024-08-31 PROCEDURE — 2500000002 HC RX 250 W HCPCS SELF ADMINISTERED DRUGS (ALT 637 FOR MEDICARE OP, ALT 636 FOR OP/ED): Performed by: STUDENT IN AN ORGANIZED HEALTH CARE EDUCATION/TRAINING PROGRAM

## 2024-08-31 PROCEDURE — 2500000002 HC RX 250 W HCPCS SELF ADMINISTERED DRUGS (ALT 637 FOR MEDICARE OP, ALT 636 FOR OP/ED)

## 2024-08-31 PROCEDURE — 36415 COLL VENOUS BLD VENIPUNCTURE: CPT | Performed by: SPECIALIST

## 2024-08-31 PROCEDURE — 85027 COMPLETE CBC AUTOMATED: CPT | Performed by: STUDENT IN AN ORGANIZED HEALTH CARE EDUCATION/TRAINING PROGRAM

## 2024-08-31 PROCEDURE — 96375 TX/PRO/DX INJ NEW DRUG ADDON: CPT

## 2024-08-31 PROCEDURE — 83735 ASSAY OF MAGNESIUM: CPT | Performed by: STUDENT IN AN ORGANIZED HEALTH CARE EDUCATION/TRAINING PROGRAM

## 2024-08-31 RX ORDER — CEFTRIAXONE 1 G/50ML
1 INJECTION, SOLUTION INTRAVENOUS EVERY 24 HOURS
Status: DISCONTINUED | OUTPATIENT
Start: 2024-08-31 | End: 2024-08-31 | Stop reason: HOSPADM

## 2024-08-31 RX ORDER — INSULIN GLARGINE 100 [IU]/ML
20 INJECTION, SOLUTION SUBCUTANEOUS EVERY 24 HOURS
Status: DISCONTINUED | OUTPATIENT
Start: 2024-08-31 | End: 2024-08-31 | Stop reason: HOSPADM

## 2024-08-31 RX ORDER — ENOXAPARIN SODIUM 100 MG/ML
40 INJECTION SUBCUTANEOUS DAILY
Status: DISCONTINUED | OUTPATIENT
Start: 2024-08-31 | End: 2024-08-31 | Stop reason: HOSPADM

## 2024-08-31 RX ORDER — AMOXICILLIN AND CLAVULANATE POTASSIUM 875; 125 MG/1; MG/1
1 TABLET, FILM COATED ORAL 2 TIMES DAILY
Qty: 20 TABLET | Refills: 0 | Status: SHIPPED | OUTPATIENT
Start: 2024-08-31 | End: 2024-09-10

## 2024-08-31 RX ORDER — FLUCONAZOLE 150 MG/1
150 TABLET ORAL SEE ADMIN INSTRUCTIONS
Qty: 2 TABLET | Refills: 0 | Status: SHIPPED | OUTPATIENT
Start: 2024-09-07 | End: 2024-09-08

## 2024-08-31 RX ORDER — GLIMEPIRIDE 4 MG/1
2 TABLET ORAL
Qty: 15 TABLET | Refills: 0 | Status: SHIPPED | OUTPATIENT
Start: 2024-08-31 | End: 2024-09-30

## 2024-08-31 RX ORDER — INSULIN LISPRO 100 [IU]/ML
5 INJECTION, SOLUTION INTRAVENOUS; SUBCUTANEOUS
Status: DISCONTINUED | OUTPATIENT
Start: 2024-08-31 | End: 2024-08-31 | Stop reason: HOSPADM

## 2024-08-31 RX ADMIN — POTASSIUM CHLORIDE AND SODIUM CHLORIDE 100 ML/HR: 900; 300 INJECTION, SOLUTION INTRAVENOUS at 08:23

## 2024-08-31 RX ADMIN — INSULIN LISPRO 10 UNITS: 100 INJECTION, SOLUTION INTRAVENOUS; SUBCUTANEOUS at 00:29

## 2024-08-31 RX ADMIN — METHADONE HYDROCHLORIDE 70 MG: 10 TABLET ORAL at 10:15

## 2024-08-31 RX ADMIN — PANTOPRAZOLE SODIUM 40 MG: 40 INJECTION, POWDER, FOR SOLUTION INTRAVENOUS at 09:56

## 2024-08-31 RX ADMIN — POTASSIUM CHLORIDE AND SODIUM CHLORIDE 100 ML/HR: 900; 300 INJECTION, SOLUTION INTRAVENOUS at 06:17

## 2024-08-31 RX ADMIN — CEFTRIAXONE SODIUM 1 G: 1 INJECTION, SOLUTION INTRAVENOUS at 03:41

## 2024-08-31 RX ADMIN — INSULIN LISPRO 2 UNITS: 100 INJECTION, SOLUTION INTRAVENOUS; SUBCUTANEOUS at 08:17

## 2024-08-31 RX ADMIN — ENOXAPARIN SODIUM 40 MG: 40 INJECTION SUBCUTANEOUS at 08:18

## 2024-08-31 ASSESSMENT — PAIN DESCRIPTION - LOCATION: LOCATION: ABDOMEN

## 2024-08-31 ASSESSMENT — PAIN DESCRIPTION - ORIENTATION: ORIENTATION: LOWER

## 2024-08-31 ASSESSMENT — PAIN SCALES - GENERAL
PAINLEVEL_OUTOF10: 0 - NO PAIN
PAINLEVEL_OUTOF10: 2

## 2024-08-31 NOTE — CONSULTS
"  Rosalinda Brizuela  1980  [id number]    Date of Service  8/31/2024    Attending  Adrien Dumont MD    Present Illness  The patient is being seen for consult for uncontrolled diabetes.    The patient is a 43-year-old woman who has been admitted secondary to at least 1 week history of not feeling well.  She has had nausea, abdominal discomfort, not able to tolerate any solid food.    The patient has been known to have diabetes since 2016.  \"Since my neck surgery\".  She has been told to take metformin 1 tablet 3 times a day before next whenever she eats), but she only eats once a day and therefore she only has been taking it once a day.    The patient denies any fever or chills.  She said that she has been diagnosed with have a UTI now.  She denies any abdominal pain.  She has been able to keep food down.    Patient reports a loss of positive pounds since the loss of her  in November 2023    Review of Systems:  Review of Systems  Headaches.  Occasional headaches  Eyes positive cataract surgery done.  Ear nose throat negative  Thyroid negative  Cardiovascular denies chest pain  Pulmonary denies shortness of breath  GI see present illness   \"I am not diagnosed with UTI\".  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  Neurologic denies numbness In the hands when she wakes up-left hand.  Skin negative  Psych \"yes I am depressed\".    Allergies:  Allergies   Allergen Reactions    Cephalexin Unknown     Childhood allergy    Penicillins Unknown     Childhood allergy    Ciprofloxacin Swelling    Fluocinolone Swelling    Sulfamethoxazole-Trimethoprim Hives and Itching       Medications:    Current Facility-Administered Medications:     acetaminophen (Tylenol) tablet 650 mg, 650 mg, oral, q4h PRN, Brittney Hunt MD    cefTRIAXone (Rocephin) 1 g in dextrose (iso) IV 50 mL, 1 g, intravenous, q24h, Brittney Hunt MD, Stopped at 08/31/24 0411    enoxaparin " (Lovenox) syringe 40 mg, 40 mg, subcutaneous, Daily, Brittney Hunt MD, 40 mg at 24 0818    insulin lispro (HumaLOG) injection 0-10 Units, 0-10 Units, subcutaneous, After meals & nightly, Brittney Hunt MD, 2 Units at 24 0817    magnesium hydroxide (Milk of Magnesia) 2,400 mg/10 mL suspension 10 mL, 10 mL, oral, Daily PRN, Brittney Hunt MD    melatonin tablet 6 mg, 6 mg, oral, Nightly PRN, Brittney Hunt MD    methadone (Dolophine) tablet 70 mg, 70 mg, oral, Daily, Tori Dobson DO, 70 mg at 24 1015    ondansetron (Zofran) injection 4 mg, 4 mg, intravenous, q6h PRN, Brittney Hunt MD    pantoprazole (ProtoNix) injection 40 mg, 40 mg, intravenous, Daily, Brittney Hunt MD, 40 mg at 24 0956    Past Medical History  Past Medical History:   Diagnosis Date    Diabetes mellitus (Multi)     Drug abuse (Multi)     Ovarian cyst     Personal history of other diseases of the respiratory system     History of bronchitis    Personal history of other endocrine, nutritional and metabolic disease     History of diabetes mellitus       Family History  Father had diabetes and  of heart disease.  Mother had diabetes and  of heart disease.  Brother had diabetes and  of heart disease.  Sister had diabetes and  of alcoholism-lung disease      Social History   reports that she has quit smoking. Her smoking use included cigarettes. She has never used smokeless tobacco. She reports that she does not currently use alcohol. She reports that she does not currently use drugs.  Patient is taking care of her kids 20 and 22-year-old 4).  Has 1 stepson as well.        Physical Exam  Physical Exam  Blood pressure 120/61, oxygen saturation 98% room air, pulse 94, respiratory rate 16,  temperature 37.1.  Height 162.6 cm  Weight 69.9 kg  BMI 26.43 kg/m².  Head minimal bitemporal wasting.  Eyes anicteric  Throat no URI signs.  Thyroid negative  Chest normal respiratory schedule  Lungs clear to  auscultation  Heart: S1-S2, no murmurs  Abdomen soft, no tenderness, no CVA tenderness  Lower extremities negative edema  Skin dry  Neurologic alert oriented times reflexes decreased  Psychiatric decreased mood.        Labs:  Results from last 7 days   Lab Units 08/31/24  0646 08/30/24  1634   SODIUM mmol/L 131* 127*   POTASSIUM mmol/L 3.5 3.3*   CHLORIDE mmol/L 93* 85*   CO2 mmol/L 29 32   BUN mg/dL 7 4*   CREATININE mg/dL 0.45* 0.65   CALCIUM mg/dL 8.4* 9.2   PROTEIN TOTAL g/dL  --  7.6   BILIRUBIN TOTAL mg/dL  --  0.9   ALK PHOS U/L  --  104   ALT U/L  --  39   AST U/L  --  41*   GLUCOSE mg/dL 192* 435*         Results from last 7 days   Lab Units 08/30/24  1634   BETA HYDROXYBUTYRATE mmol/L 0.31*     Results from last 7 days   Lab Units 08/31/24  0646 08/31/24  0617 08/31/24  0232 08/30/24  2345 08/30/24  2210 08/30/24  2112 08/30/24  1634 08/30/24  1550   POCT GLUCOSE mg/dL  --  194* 204* 299* 340* 336*  --  449*   GLUCOSE mg/dL 192*  --   --   --   --   --  435*  --    BETA HYDROXYBUTYRATE mmol/L  --   --   --   --   --   --  0.31*  --                Assessment and Plan:    # Diabetes mellitus type 2-uncontrolled.  Patient is agreeable to starting insulin.  Was started on Lantus insulin and given also Humalog insulin and prandially.  Will need to know how to use insulin.  Unfortunately, although I will order a dietitian to see the patient that his service may not be available over the weekend.  Patient is aware that if his glucose levels are improving, her energy level will improve as well.    # Thyroid status to be determined.    # Lipid status to be determined    # Chronic narcotic use-patient is on methadone.    # Weight loss) secondary to bleeding and uncontrolled diabetes.    # UTI on ceftriaxone.    Please see orders.

## 2024-08-31 NOTE — CARE PLAN
The patient's goals for the shift include to not have any nausea    The clinical goals for the shift include pt will not have any nausea    Over the shift, the patient did not have nay c/o nausea will continue to monitor

## 2024-08-31 NOTE — PROGRESS NOTES
08/31/24 1049   Discharge Planning   Living Arrangements Children   Support Systems Children   Type of Residence Private residence   Who is requesting discharge planning? Provider   Expected Discharge Disposition Home     Met with pt, pt admit for  high blood sugars, pt has hx of non compliance with her medical care.  Pt wants to leave AMA, requesting her methadone and states we are with holding it.  RN aware and orders for her methadone are in process.  Plan is for home at discharge.   Kathy Jurado RN T[facility]

## 2024-08-31 NOTE — DISCHARGE SUMMARY
Discharge Diagnosis  #Hyperglycemia  #Poorly controlled diabetes type 2  #Hyponatremia secondary to hyperglycemia  #Hypokalemia  #UTI  #Vaginal candidiasis  #Mood disorder      Issues Requiring Follow Up  Please follow up with your primary care provider for workup of hyperglycemia uncontrolled by metformin.    Discharge Meds     Your medication list        ASK your doctor about these medications        Instructions Last Dose Given Next Dose Due   metFORMIN 1,000 mg tablet  Commonly known as: Glucophage           methadone 10 mg/5 mL solution  Commonly known as: Dolophine                    Test Results Pending At Discharge  Pending Labs       Order Current Status    Hemoglobin A1c In process    Urine Culture In process    Vitamin D 25-Hydroxy,Total (for eval of Vitamin D levels) In process            Hospital Course  Rosalinda is a 43-year-old female with a past medical history significant for diabetes type 2 not well-controlled, noncompliant with her metformin and refused insulin on admission.  She also has depression anxiety bipolar and history of meningioma resection 2015 and history of opioid abuse on methadone.  She presented due to frontal headache with nausea and vomiting poor appetite saying nothing taste good, diarrhea loose stools and vaginal discharge itchiness for the past 5 days.  Patient was at work when one of her colleagues suggested her to go to the urgent care due to high blood sugars and was sent to [facility].  ED patient's glucose was 449, CBC with WBC 6.7, hemoglobin 12, platelet 310, CMP sodium 127, potassium 3.3, creatinine 0.6, BUN 4, anion gap 13, bicarb 32, chloride 85, albumin 3.4, calcium 9.2, AST 41, ALT 39.  Magnesium was 1.8.  Lipase less than 3, lactate 1.3, beta hydroxybutyrate 0.3, lactic acid negative.  Urinalysis was abnormal showing cloudy, positive for glucose, protein, blood, ketones, leukocyte esterase, WBC and bacteria +4.  Patient was started on ceftriaxone 1 g daily, pending  urine culture.  She was given lispro 10 units and sliding scale #1, her blood glucose reduced to 194 at 6 AM next morning.  Endocrinology was consulted however patient left AMA despite explaining the risks of leaving without proper medical treatment.  We are prescribing fluconazole (for vaginal candidiasis), amox-clav (for UTI), metformin and glimepiride (for hyperglycemia control), I told the patient I will be prescribing them so she can pick them up from her local pharmacy.  I have also done an internal referral for setting up a primary care physician for the patient.    Pertinent Physical Exam At Time of Discharge  Physical Exam  Constitutional: well developed, awake, alert, no acute distress  ENMT: mucous membranes moist, EOMI, conjunctivae clear  Head/Neck: normocephalic, atraumatic; supple, trachea midline  Respiratory/Thorax: patent airways, CTAB; no wheezes, rales, or rhonchi  Cardiovascular: RRR, no murmur  Gastrointestinal: soft, nondistended, non-tender, bowel sounds appreciated  Extremities: palpable peripheral pulses, no edema or cyanosis  Neurological: AO x3, no focal deficits  Psychological: appropriate mood and behavior  Skin: warm and dry    Outpatient Follow Up  No future appointments.      Dyan Guillaume MD  PGY1 internal medicine

## 2024-09-01 LAB — BACTERIA UR CULT: ABNORMAL

## 2024-09-03 LAB
ATRIAL RATE: 83 BPM
EST. AVERAGE GLUCOSE BLD GHB EST-MCNC: 283 MG/DL
HBA1C MFR BLD: 11.5 %
P AXIS: 47 DEGREES
PR INTERVAL: 142 MS
Q ONSET: 253 MS
QRS COUNT: 13 BEATS
QRS DURATION: 90 MS
QT INTERVAL: 371 MS
QTC CALCULATION(BAZETT): 436 MS
QTC FREDERICIA: 413 MS
R AXIS: -1 DEGREES
T AXIS: 11 DEGREES
T OFFSET: 438 MS
VENTRICULAR RATE: 83 BPM